# Patient Record
Sex: FEMALE | URBAN - METROPOLITAN AREA
[De-identification: names, ages, dates, MRNs, and addresses within clinical notes are randomized per-mention and may not be internally consistent; named-entity substitution may affect disease eponyms.]

---

## 2021-02-12 ENCOUNTER — OFFICE VISIT (OUTPATIENT)
Dept: URBAN - METROPOLITAN AREA CLINIC 27 | Facility: CLINIC | Age: 51
End: 2021-02-12
Payer: COMMERCIAL

## 2021-02-12 DIAGNOSIS — H04.123 DRY EYE SYNDROME OF BILATERAL LACRIMAL GLANDS: ICD-10-CM

## 2021-02-12 DIAGNOSIS — H33.321 ROUND HOLE, RIGHT EYE: Primary | ICD-10-CM

## 2021-02-12 DIAGNOSIS — D31.32 BENIGN NEOPLASM OF LEFT CHOROID: ICD-10-CM

## 2021-02-12 PROCEDURE — 99204 OFFICE O/P NEW MOD 45 MIN: CPT | Performed by: OPHTHALMOLOGY

## 2021-02-12 PROCEDURE — 92134 CPTRZ OPH DX IMG PST SGM RTA: CPT | Performed by: OPHTHALMOLOGY

## 2021-02-12 ASSESSMENT — INTRAOCULAR PRESSURE
OS: 15
OD: 17

## 2021-02-12 NOTE — IMPRESSION/PLAN
Impression: Round hole, OD.
s/p laser 02/06/2015 Plan: Exam reveals a good laser treatment surrounding retinal hole at 5 oclock in an area of lattice OD. OCT reveals no ERM. Thanks, Shreya Thrasher Return in 12 months for follow up, OCT OU

## 2021-02-12 NOTE — IMPRESSION/PLAN
Impression: Choroidal Nevus, OS. Plan: There is a 3 DD flat nevus in the temporal macula. No orange pigment. Observe.

## 2022-12-16 ENCOUNTER — OFFICE VISIT (OUTPATIENT)
Dept: URBAN - METROPOLITAN AREA CLINIC 41 | Facility: CLINIC | Age: 52
End: 2022-12-16
Payer: COMMERCIAL

## 2022-12-16 DIAGNOSIS — H04.123 DRY EYE SYNDROME OF BILATERAL LACRIMAL GLANDS: ICD-10-CM

## 2022-12-16 DIAGNOSIS — H33.321 ROUND HOLE, RIGHT EYE: Primary | ICD-10-CM

## 2022-12-16 DIAGNOSIS — D31.30 BENIGN NEOPLASM OF CHOROID: ICD-10-CM

## 2022-12-16 PROCEDURE — 92134 CPTRZ OPH DX IMG PST SGM RTA: CPT | Performed by: OPHTHALMOLOGY

## 2022-12-16 PROCEDURE — 99214 OFFICE O/P EST MOD 30 MIN: CPT | Performed by: OPHTHALMOLOGY

## 2022-12-16 ASSESSMENT — INTRAOCULAR PRESSURE
OD: 15
OS: 14

## 2022-12-16 NOTE — IMPRESSION/PLAN
Impression: Round hole, right eye H33.321
-s/p Retinopexy 02/06/2015-DYK Plan: Exam reveals a good laser treatment surrounding retinal hole at 5 oclock in an area of lattice OD. OCT reveals no ERM. RTC: RCA PRN

## 2022-12-16 NOTE — IMPRESSION/PLAN
Impression: Dry eye syndrome of bilateral lacrimal glands H04.123 Plan: Patient notes tearing. Exam demonstrates PEE. Discussed R/B/A of ATs, PFATs, Restasis, vs punctal plugs.  Rec ATs

## 2022-12-16 NOTE — IMPRESSION/PLAN
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 Plan: Exam demonstrates small low risk area of nevus vs RPE hyperplasia. No high risk features at this time. Observe for now.